# Patient Record
Sex: FEMALE | Race: WHITE | NOT HISPANIC OR LATINO | Employment: FULL TIME | URBAN - METROPOLITAN AREA
[De-identification: names, ages, dates, MRNs, and addresses within clinical notes are randomized per-mention and may not be internally consistent; named-entity substitution may affect disease eponyms.]

---

## 2024-11-21 ENCOUNTER — EVALUATION (OUTPATIENT)
Dept: PHYSICAL THERAPY | Facility: CLINIC | Age: 44
End: 2024-11-21
Payer: COMMERCIAL

## 2024-11-21 DIAGNOSIS — M76.892 HIP FLEXOR TENDONITIS, LEFT: ICD-10-CM

## 2024-11-21 DIAGNOSIS — M25.552 LEFT HIP PAIN: Primary | ICD-10-CM

## 2024-11-21 DIAGNOSIS — M22.2X2 PATELLOFEMORAL SYNDROME OF LEFT KNEE: ICD-10-CM

## 2024-11-21 PROCEDURE — 97161 PT EVAL LOW COMPLEX 20 MIN: CPT

## 2024-11-21 NOTE — PROGRESS NOTES
PT Evaluation     Today's date: 2024  Patient name: Nidhi Henry  : 1980  MRN: [id number]  Referring provider: Alphonse Rogers MD  Dx:   Encounter Diagnosis     ICD-10-CM    1. Left hip pain  M25.552       2. Hip flexor tendonitis, left  M76.892       3. Patellofemoral syndrome of left knee  M22.2X2                      Assessment  Impairments: abnormal coordination, abnormal muscle firing, abnormal or restricted ROM, impaired physical strength, lacks appropriate home exercise program, pain with function, weight-bearing intolerance, attention deficits, participation limitations, activity limitations and endurance  Symptom irritability: low    Assessment details: Patient is a 44 year old female with both left hip and left knee pain. Patient has deficits with hip flexor and hip abduction strength. Patient has deficits with squats and sitting for long periods of time at work. Patient was positive for iliana test and had knee valgus with SL squat. Patient was educated on HEP and injury. Patient's deficits are consistent with patellafemoral syndrome and hip flexor tendonitis. Patient would benefit from continued skilled PT to address to PLOF.   Understanding of Dx/Px/POC: good     Prognosis: good    Goals  STG 1-4 weeks   1. Patient will be independent with HEP.   2. Patient will be in less than 3/10 pain with ADLS.   3. Patient will increase all planes of LE MMTs by 1  4. Patient will be able to sit for 4 hours without hip pain.   5. Patient will be able to do a lateral step down without knee valgus.     LTG 6-8 weeks.   1. Patient will increase FOTO score by 10 points.   2. Patient will be able to squat without compensations.   3. Patient will be able to run one mile without pain.   5. Patient will be able to have 5/5 LE strength to help improve with recreational activities.        Plan  Patient would benefit from: skilled physical therapy  Planned modality interventions: cryotherapy    Planned  therapy interventions: ADL retraining, ADL training, balance, breathing training, flexibility, functional ROM exercises, gait training, graded activity, graded exercise, strengthening, stretching, therapeutic activities, therapeutic exercise, therapeutic training, patient/caregiver education, manual therapy, joint mobilization, activity modification, abdominal trunk stabilization, balance/weight bearing training, muscle pump exercises, nerve gliding and neuromuscular re-education    Frequency: 2x week  Duration in weeks: 6  Plan of Care beginning date: 11/21/2024  Plan of Care expiration date: 12/23/2024  Treatment plan discussed with: patient  Plan details: HEP development, stretching, strengthening, A/AA/PROM, joint mobilizations, posture education, body mechanics training for bending and lifting, STM/MI as needed to reduce muscle tension, balance and proprioceptive training, muscle reeducation, PLOC discussed and agreed upon with patient.     Subjective Evaluation    History of Present Illness  Mechanism of injury: Patient reports that she has lingering Left hip and now is starting to get knee pain. She said her hip pain has been going on for 3 years. She said that kneeling bothers it the most. She noticed it when she was kneeling and painting her deck. She said she is not able squat deep without pain. She said Frog squat hurts. For working out she does [place], walking on an incline, plyometrics in basement. She says she sit behind a desk all day. She has a standing desk but needs to use it more. She says that hip flexor stretches feel good. She says external rotation with her hip bother it, especially when the o when it bothers it   Feels stuck   Crossing legs bothers     Used to be a gymnast   Courtneying   [facility]     asher Valdez, bird dog   Patient Goals  Patient goals for therapy: decreased pain, improved balance, independence with ADLs/IADLs, increased strength, return to work and increased  "motion    Pain  Current pain ratin  At best pain ratin  At worst pain rating: 10  Quality: dull ache  Relieving factors: change in position and relaxation  Aggravating factors: sitting    Social Support  Lives with: significant other and young children    Employment status: working    Diagnostic Tests  X-ray: normal  Treatments  Current treatment: physical therapy    Objective     Tenderness     Left Hip   Tenderness in the ASIS. No tenderness in the greater trochanter.     Neurological Testing     Sensation     Hip   Left Hip   Intact: light touch    Right Hip   Intact: light touch    Active Range of Motion   Left Hip   Normal active range of motion    Strength/Myotome Testing     Left Hip   Planes of Motion   Flexion: 4  Extension: 5  Abduction: 4    Isolated Muscles   Gluteus medius: 4+    Right Hip   Planes of Motion   Flexion: 5  Extension: 5  Abduction: 4+    Left Knee   Flexion: 5  Extension: 5    Right Knee   Flexion: 5  Extension: 5    Tests     Left Hip   Positive FADIR and scour.   Negative FRANK.   Waldemar: Positive.   SLR: Positive.     Ambulation     Observational Gait   Gait: within functional limits     Functional Assessment      Squat    Left within functional limits and right within functional limits.     Single Leg Squat   Left Leg  Pain and valgus.     Right Leg  Valgus.     Forward Step Up 8\"   Left Leg  Within functional limits.     Right Leg  Within functional limits.     Forward Step Down 8\"   Left Leg  Valgus.     Single Leg Stance   Left single leg stance time: wnl.    Comments  Lateral step down more knee valgus with L compared to R            Insurance:  A/CMS Eval/ Re-eval Auth #/ Referral # Total units or visits Start date  Expiration date KX? Visit limitation?  PT only or  PT+OT? Co-Insurance   CMS  no    0                 POC Start Date POC Expiration Date Signed POC?          Date               Visits/Units: pend Used 1              Authed:  " Remaining                    Precautions: NA      11/21       IE   Manuals                                   Neuro Re-Ed       deadbug       Birddog        chops       SL squats TRX                             Ther Ex       SLR    2x10   Hip abduction    2x10   SL bridge    2x10   Hip flexor stretch     2x10                               Ther Activity                     Gait Training                     Modalities

## 2024-11-21 NOTE — LETTER
2024    Alphonse Rogers MD  [address] [facility] [address]    Patient: Nidhi Henry   YOB: 1980   Date of Visit: 2024     Encounter Diagnosis     ICD-10-CM    1. Left hip pain  M25.552       2. Hip flexor tendonitis, left  M76.892       3. Patellofemoral syndrome of left knee  M22.2X2           Dear Dr. Rogers:    Thank you for your recent referral of Nidhi Henry. Please review the attached evaluation summary from Nidhi's recent visit.     Please verify that you agree with the plan of care by signing the attached order.     If you have any questions or concerns, please do not hesitate to call.     I sincerely appreciate the opportunity to share in the care of one of your patients and hope to have another opportunity to work with you in the near future.       Sincerely,    Selam Carrero, PT      Referring Provider:      I certify that I have read the below Plan of Care and certify the need for these services furnished under this plan of treatment while under my care.                    Alphonse Rogers MD  [address] [facility] [address]  Via Fax: [phone]          PT Evaluation     Today's date: 2024  Patient name: iNdhi Henry  : 1980  MRN: [id number]  Referring provider: Alphonse Rogers MD  Dx:   Encounter Diagnosis     ICD-10-CM    1. Left hip pain  M25.552       2. Hip flexor tendonitis, left  M76.892       3. Patellofemoral syndrome of left knee  M22.2X2                      Assessment  Impairments: abnormal coordination, abnormal muscle firing, abnormal or restricted ROM, impaired physical strength, lacks appropriate home exercise program, pain with function, weight-bearing intolerance, attention deficits, participation limitations, activity limitations and endurance  Symptom irritability: low    Assessment details: Patient is a 44 year old female with both left hip and left knee pain. Patient has deficits with hip flexor  and hip abduction strength. Patient has deficits with squats and sitting for long periods of time at work. Patient was positive for iliana test and had knee valgus with SL squat. Patient was educated on HEP and injury. Patient's deficits are consistent with patellafemoral syndrome and hip flexor tendonitis. Patient would benefit from continued skilled PT to address to PLOF.   Understanding of Dx/Px/POC: good     Prognosis: good    Goals  STG 1-4 weeks   1. Patient will be independent with HEP.   2. Patient will be in less than 3/10 pain with ADLS.   3. Patient will increase all planes of LE MMTs by 1  4. Patient will be able to sit for 4 hours without hip pain.   5. Patient will be able to do a lateral step down without knee valgus.     LTG 6-8 weeks.   1. Patient will increase FOTO score by 10 points.   2. Patient will be able to squat without compensations.   3. Patient will be able to run one mile without pain.   5. Patient will be able to have 5/5 LE strength to help improve with recreational activities.        Plan  Patient would benefit from: skilled physical therapy  Planned modality interventions: cryotherapy    Planned therapy interventions: ADL retraining, ADL training, balance, breathing training, flexibility, functional ROM exercises, gait training, graded activity, graded exercise, strengthening, stretching, therapeutic activities, therapeutic exercise, therapeutic training, patient/caregiver education, manual therapy, joint mobilization, activity modification, abdominal trunk stabilization, balance/weight bearing training, muscle pump exercises, nerve gliding and neuromuscular re-education    Frequency: 2x week  Duration in weeks: 6  Plan of Care beginning date: 11/21/2024  Plan of Care expiration date: 12/23/2024  Treatment plan discussed with: patient  Plan details: HEP development, stretching, strengthening, A/AA/PROM, joint mobilizations, posture education, body mechanics training for bending and  lifting, STM/MI as needed to reduce muscle tension, balance and proprioceptive training, muscle reeducation, PLOC discussed and agreed upon with patient.     Subjective Evaluation    History of Present Illness  Mechanism of injury: Patient reports that she has lingering Left hip and now is starting to get knee pain. She said her hip pain has been going on for 3 years. She said that kneeling bothers it the most. She noticed it when she was kneeling and painting her deck. She said she is not able squat deep without pain. She said Frog squat hurts. For working out she does [place], walking on an incline, plyometrics in basement. She says she sit behind a desk all day. She has a standing desk but needs to use it more. She says that hip flexor stretches feel good. She says external rotation with her hip bother it, especially when the o when it bothers it   Feels stuck   Crossing legs bothers     Used to be a gymnast   Clicking   [facility] [employer]     Bridge, deadbug, bird dog   Patient Goals  Patient goals for therapy: decreased pain, improved balance, independence with ADLs/IADLs, increased strength, return to work and increased motion    Pain  Current pain ratin  At best pain ratin  At worst pain rating: 10  Quality: dull ache  Relieving factors: change in position and relaxation  Aggravating factors: sitting    Social Support  Lives with: significant other and young children    Employment status: working    Diagnostic Tests  X-ray: normal  Treatments  Current treatment: physical therapy    Objective     Tenderness     Left Hip   Tenderness in the ASIS. No tenderness in the greater trochanter.     Neurological Testing     Sensation     Hip   Left Hip   Intact: light touch    Right Hip   Intact: light touch    Active Range of Motion   Left Hip   Normal active range of motion    Strength/Myotome Testing     Left Hip   Planes of Motion   Flexion: 4  Extension: 5  Abduction: 4    Isolated Muscles   Gluteus  "medius: 4+    Right Hip   Planes of Motion   Flexion: 5  Extension: 5  Abduction: 4+    Left Knee   Flexion: 5  Extension: 5    Right Knee   Flexion: 5  Extension: 5    Tests     Left Hip   Positive FADIR and scour.   Negative FRANK.   Waldemar: Positive.   SLR: Positive.     Ambulation     Observational Gait   Gait: within functional limits     Functional Assessment      Squat    Left within functional limits and right within functional limits.     Single Leg Squat   Left Leg  Pain and valgus.     Right Leg  Valgus.     Forward Step Up 8\"   Left Leg  Within functional limits.     Right Leg  Within functional limits.     Forward Step Down 8\"   Left Leg  Valgus.     Single Leg Stance   Left single leg stance time: wnl.    Comments  Lateral step down more knee valgus with L compared to R            Insurance:  A/CMS Eval/ Re-eval Auth #/ Referral # Total units or visits Start date  Expiration date KX? Visit limitation?  PT only or  PT+OT? Co-Insurance   CMS 11/21 11/21 no    0                 POC Start Date POC Expiration Date Signed POC?   11/21 12/21       Date 11/21              Visits/Units: pend Used 1              Authed:  Remaining                    Precautions: NA      11/21       IE   Manuals                                   Neuro Re-Ed       deadbug       Birddog        chops       SL squats TRX                             Ther Ex       SLR    2x10   Hip abduction    2x10   SL bridge    2x10   Hip flexor stretch     2x10                               Ther Activity                     Gait Training                     Modalities                                         "

## 2024-11-27 ENCOUNTER — OFFICE VISIT (OUTPATIENT)
Dept: PHYSICAL THERAPY | Facility: CLINIC | Age: 44
End: 2024-11-27
Payer: COMMERCIAL

## 2024-11-27 DIAGNOSIS — M76.892 HIP FLEXOR TENDONITIS, LEFT: ICD-10-CM

## 2024-11-27 DIAGNOSIS — M22.2X2 PATELLOFEMORAL SYNDROME OF LEFT KNEE: ICD-10-CM

## 2024-11-27 DIAGNOSIS — M25.552 LEFT HIP PAIN: Primary | ICD-10-CM

## 2024-11-27 PROCEDURE — 97112 NEUROMUSCULAR REEDUCATION: CPT

## 2024-11-27 NOTE — PROGRESS NOTES
Daily Note     Today's date: 2024  Patient name: Nidhi Henry  : 1980  MRN: [id number]  Referring provider: Alphonse Rogers MD  Dx:   Encounter Diagnosis     ICD-10-CM    1. Left hip pain  M25.552       2. Patellofemoral syndrome of left knee  M22.2X2       3. Hip flexor tendonitis, left  M76.892                      Subjective: Patient said her hip and knee are feeling good today. She said she has been doing her HEP.       Objective: See treatment diary below      Assessment: Tolerated treatment well. Patient was given verbal cueing for lateral step downs. Patient was given verbal cueing for pelvic control of dead bugs. Patient can progress with more SL exercises.  Patient would benefit from continued PT      Plan: Continue per plan of care.       Insurance:  A/CMS Eval/ Re-eval Auth #/ Referral # Total units or visits Start date  Expiration date KX? Visit limitation?  PT only or  PT+OT? Co-Insurance   CMS  no    0                 POC Start Date POC Expiration Date Signed POC?          Date              Visits/Units: pend Used 1 2             Authed:  Remaining                    Precautions: NA            IE   Manuals                                   Neuro Re-Ed       deadbug   2x10    Birddog        chops       SL squats TRX        SL clamshells    Red 2x10     Lateral step down   2x10 4 inch step     SL RDL    X15     Static lung       Ther Ex       SLR   2x10  2x10   Hip abduction    2x10   SL bridge    2x10   Hip flexor stretch     2x10   FRANK   2x30s                          Ther Activity                     Gait Training                     Modalities

## 2024-12-02 ENCOUNTER — OFFICE VISIT (OUTPATIENT)
Dept: PHYSICAL THERAPY | Facility: CLINIC | Age: 44
End: 2024-12-02
Payer: COMMERCIAL

## 2024-12-02 DIAGNOSIS — M25.552 LEFT HIP PAIN: ICD-10-CM

## 2024-12-02 DIAGNOSIS — M22.2X2 PATELLOFEMORAL SYNDROME OF LEFT KNEE: Primary | ICD-10-CM

## 2024-12-02 DIAGNOSIS — M76.892 HIP FLEXOR TENDONITIS, LEFT: ICD-10-CM

## 2024-12-02 PROCEDURE — 97112 NEUROMUSCULAR REEDUCATION: CPT

## 2024-12-02 NOTE — PROGRESS NOTES
Daily Note     Today's date: 2024  Patient name: Nidhi Henry  : 1980  MRN: [id number]  Referring provider: Alphonse Rogers MD  Dx:   Encounter Diagnosis     ICD-10-CM    1. Patellofemoral syndrome of left knee  M22.2X2       2. Hip flexor tendonitis, left  M76.892       3. Left hip pain  M25.552                      Subjective: Patient said her hip is feeling good. She said she did a lot of walking this weekend and it went well.       Objective: See treatment diary below      Assessment: Tolerated treatment well. Patient was given cueing for knee valgus with SL leg press. Patient was able to properly engage core with chops w the eleni and paloff press. Patient would benefit from continued PT      Plan: Continue per plan of care.         Insurance:  A/CMS Eval/ Re-eval Auth #/ Referral # Total units or visits Start date  Expiration date KX? Visit limitation?  PT only or  PT+OT? Co-Insurance   CMS  no    0                 POC Start Date POC Expiration Date Signed POC?          Date              Visits/Units: pend Used 1 2 3            Authed:  Remaining                    Precautions: NA           IE   Manuals                                   Neuro Re-Ed       deadbug   2x10    Birddog    2x10    chops  2x10 eleni 3.0      SL squats TRX        SL clamshells    Red 2x10     Lateral step down  2x10 6 inch w hip hike 2x10 4 inch step     SL RDL    X15     Static lung   2x10     Side steps  4 laps      Ther Ex       SLR  Off the table 2x10  2x10  2x10   Prone ext   2x10 off the table      Hip abduction    2x10   SL bridge    2x10   Hip flexor stretch     2x10   FRANK  2x30s  2x30s     Waldemar stretch   2x30s                    Ther Activity                     Gait Training                     Modalities

## 2024-12-04 ENCOUNTER — OFFICE VISIT (OUTPATIENT)
Dept: PHYSICAL THERAPY | Facility: CLINIC | Age: 44
End: 2024-12-04
Payer: COMMERCIAL

## 2024-12-04 DIAGNOSIS — M25.552 LEFT HIP PAIN: ICD-10-CM

## 2024-12-04 DIAGNOSIS — M76.892 HIP FLEXOR TENDONITIS, LEFT: ICD-10-CM

## 2024-12-04 DIAGNOSIS — M22.2X2 PATELLOFEMORAL SYNDROME OF LEFT KNEE: Primary | ICD-10-CM

## 2024-12-04 PROCEDURE — 97112 NEUROMUSCULAR REEDUCATION: CPT

## 2024-12-04 NOTE — PROGRESS NOTES
Daily Note     Today's date: 2024  Patient name: Nidhi Henry  : 1980  MRN: [id number]  Referring provider: Alphonse Rogers MD  Dx:   Encounter Diagnosis     ICD-10-CM    1. Patellofemoral syndrome of left knee  M22.2X2       2. Hip flexor tendonitis, left  M76.892       3. Left hip pain  M25.552                      Subjective: Patient reports that she is feeling good. She said she was a little sore after last session.       Objective: See treatment diary below      Assessment: Tolerated treatment well. Patient was able to SL squats with good technique. Patient was challenged by mountain climber with slider and bosu. Patient was fatigue with nordic curls. Patient would benefit from continued PT      Plan: Continue per plan of care.         Insurance:  A/CMS Eval/ Re-eval Auth #/ Referral # Total units or visits Start date  Expiration date KX? Visit limitation?  PT only or  PT+OT? Co-Insurance   CMS  no    0                 POC Start Date POC Expiration Date Signed POC?          Date              Visits/Units: pend Used 1 2 3            Authed:  Remaining                    Precautions: NA          IE   Manuals                                   Neuro Re-Ed       deadbug   2x10    Birddog    2x10    chops  2x10 eleni 3.0      SL squats TRX        SL clamshells    Red 2x10     Lateral step down  2x10 6 inch w hip hike 2x10 4 inch step     SL RDL    X15     Static lung   2x10     Side steps  4 laps      Nordic curls  2x10 w airex       Squat w bosu 2x10      SLS on airex trampoline  2x10       Ther Ex       SLR Off the table 2x10  Off the table 2x10  2x10  2x10   Prone ext  Off of the table 2x10  2x10 off the table      Hip abduction    2x10   SL bridge Green ball 2x10     Ball lift 2x10    2x10   Hip flexor stretch     2x10   FRANK  2x30s  2x30s     Waldemar stretch   2x30s                    Ther Activity                     Gait Training                      Modalities

## 2024-12-09 ENCOUNTER — APPOINTMENT (OUTPATIENT)
Dept: PHYSICAL THERAPY | Facility: CLINIC | Age: 44
End: 2024-12-09
Payer: COMMERCIAL

## 2024-12-10 ENCOUNTER — OFFICE VISIT (OUTPATIENT)
Dept: PHYSICAL THERAPY | Facility: CLINIC | Age: 44
End: 2024-12-10
Payer: COMMERCIAL

## 2024-12-10 DIAGNOSIS — M25.552 LEFT HIP PAIN: ICD-10-CM

## 2024-12-10 DIAGNOSIS — M76.892 HIP FLEXOR TENDONITIS, LEFT: ICD-10-CM

## 2024-12-10 DIAGNOSIS — M22.2X2 PATELLOFEMORAL SYNDROME OF LEFT KNEE: Primary | ICD-10-CM

## 2024-12-10 PROCEDURE — 97112 NEUROMUSCULAR REEDUCATION: CPT

## 2024-12-10 NOTE — PROGRESS NOTES
Daily Note     Today's date: 12/10/2024  Patient name: Nidhi Henry  : 1980  MRN: [id number]  Referring provider: Alphonse Rogers MD  Dx:   Encounter Diagnosis     ICD-10-CM    1. Patellofemoral syndrome of left knee  M22.2X2       2. Hip flexor tendonitis, left  M76.892       3. Left hip pain  M25.552                      Subjective: Patient reports she did have an episode of burning pain when she was sitting with her daughter doing hw yesterday. She attribute it to maybe walking a lot during the day and then sitting too long.      Objective: See treatment diary below      Assessment: Tolerated treatment well. Patient was fatigue at the end of the session. Patient would benefit from continued PT      Plan: Continue per plan of care.         Insurance:  A/CMS Eval/ Re-eval Auth #/ Referral # Total units or visits Start date  Expiration date KX? Visit limitation?  PT only or  PT+OT? Co-Insurance   CMS  no    0                 POC Start Date POC Expiration Date Signed POC?          Date 11/21 11/27 12/2  12/4 12/10          Visits/Units: pend Used 1 2 3 4 5          Authed:  Remaining                    Precautions: NA   12/10 12/4 12/2 11/27 11/21    5 4 3 2 IE   Manuals                                        Neuro Re-Ed        deadbug    2x10    Birddog  2x10 UE and LE   2x10    chops   2x10 eleni 3.0      SL squats TRX         SL clamshells  Plank clamshells 2x10    Red 2x10     Lateral step down   2x10 6 inch w hip hike 2x10 4 inch step     SL RDL  Tidal tankk 2x10   X15     Static lung    2x10     Side steps   4 laps      Nordic curls  2x10  2x10 w airex       Squat w bosu  2x10      SLS on airex trampoline  2x10 2x10       Estonian split squat  2x10       Hip thruster on high low  2x10 SL               Ther Ex        SLR  Off the table 2x10  Off the table 2x10  2x10  2x10   Prone ext   Off of the table 2x10  2x10 off the table      Hip abduction     2x10   SL bridge  Green  ball 2x10     Ball lift 2x10    2x10   Hip flexor stretch      2x10   FRANK   2x30s  2x30s     Waldemar stretch  2x30s   2x30s                      Ther Activity                        Gait Training                        Modalities

## 2024-12-11 ENCOUNTER — APPOINTMENT (OUTPATIENT)
Dept: PHYSICAL THERAPY | Facility: CLINIC | Age: 44
End: 2024-12-11
Payer: COMMERCIAL

## 2024-12-12 ENCOUNTER — OFFICE VISIT (OUTPATIENT)
Dept: PHYSICAL THERAPY | Facility: CLINIC | Age: 44
End: 2024-12-12
Payer: COMMERCIAL

## 2024-12-12 DIAGNOSIS — M25.552 LEFT HIP PAIN: ICD-10-CM

## 2024-12-12 DIAGNOSIS — M76.892 HIP FLEXOR TENDONITIS, LEFT: ICD-10-CM

## 2024-12-12 DIAGNOSIS — M22.2X2 PATELLOFEMORAL SYNDROME OF LEFT KNEE: Primary | ICD-10-CM

## 2024-12-12 PROCEDURE — 97112 NEUROMUSCULAR REEDUCATION: CPT

## 2024-12-12 NOTE — PROGRESS NOTES
Daily Note     Today's date: 2024  Patient name: Nidhi Henry  : 1980  MRN: [id number]  Referring provider: Alphonse Rogers MD  Dx:   Encounter Diagnosis     ICD-10-CM    1. Patellofemoral syndrome of left knee  M22.2X2       2. Hip flexor tendonitis, left  M76.892       3. Left hip pain  M25.552                      Subjective: Patient reported that she is feeling good. She has some pain when she is kneeling or when she bends her knee all the way.       Objective: See treatment diary below      Assessment: Tolerated treatment well. Patient had discomfort when she was kneeling on her knees. Patient would benefit from continued PT      Plan: Continue per plan of care.         Insurance:  AMA/CMS Eval/ Re-eval Auth #/ Referral # Total units or visits Start date  Expiration date KX? Visit limitation?  PT only or  PT+OT? Co-Insurance   CMS  no    0                 POC Start Date POC Expiration Date Signed POC?          Date 11/21 11/27 12/2  12/4 12/10 12/12         Visits/Units: pend Used 1 2 3 4 5 6         Authed:  Remaining                    Precautions: NA   12/12 12/10 12/4 12/2 11/27 11/21    6 5 4 3 2 IE   Manuals                                             Neuro Re-Ed         deadbug     2x10    Birddog   2x10 UE and LE   2x10    chops With pink medicine ball 2x10    2x10 eleni 3.0      SL squats TRX  2x10         SL clamshells   Plank clamshells 2x10    Red 2x10     Lateral step down    2x10 6 inch w hip hike 2x10 4 inch step     SL RDL  [place] 3.0  Tidal tankk 2x10   X15     Static lung     2x10     Side steps    4 laps      Nordic curls  2x10 2x10  2x10 w airex       Squat w bosu   2x10      SLS on airex trampoline   2x10 2x10       Sami split squat  2x10 on table  2x10       Hip thruster on high low   2x10 SL       Side plank 2x30s         Ther Ex         SLR 2x10 2 lb   Off the table 2x10  Off the table 2x10  2x10  2x10   Prone ext    Off of the table 2x10   2x10 off the table      Hip abduction      2x10   SL bridge March 2x10   Green ball 2x10     Ball lift 2x10    2x10   Hip flexor stretch       2x10   FRANK    2x30s  2x30s     Waldemar stretch  2x30s  2x30s   2x30s                        Ther Activity                           Gait Training                           Modalities

## 2024-12-16 ENCOUNTER — APPOINTMENT (OUTPATIENT)
Dept: PHYSICAL THERAPY | Facility: CLINIC | Age: 44
End: 2024-12-16
Payer: COMMERCIAL

## 2024-12-18 ENCOUNTER — OFFICE VISIT (OUTPATIENT)
Dept: PHYSICAL THERAPY | Facility: CLINIC | Age: 44
End: 2024-12-18
Payer: COMMERCIAL

## 2024-12-18 DIAGNOSIS — M25.552 LEFT HIP PAIN: Primary | ICD-10-CM

## 2024-12-18 DIAGNOSIS — M22.2X2 PATELLOFEMORAL SYNDROME OF LEFT KNEE: ICD-10-CM

## 2024-12-18 DIAGNOSIS — M76.892 HIP FLEXOR TENDONITIS, LEFT: ICD-10-CM

## 2024-12-18 PROCEDURE — 97110 THERAPEUTIC EXERCISES: CPT

## 2024-12-18 PROCEDURE — 97112 NEUROMUSCULAR REEDUCATION: CPT

## 2024-12-18 NOTE — PROGRESS NOTES
Daily Note     Today's date: 2024  Patient name: Nidhi Henry  : 1980  MRN: [id number]  Referring provider: Alphonse Rogers MD  Dx:   Encounter Diagnosis     ICD-10-CM    1. Left hip pain  M25.552       2. Hip flexor tendonitis, left  M76.892       3. Patellofemoral syndrome of left knee  M22.2X2                      Subjective: Patient said she had pain in her knee when she bends it and is kneeling.       Objective: See treatment diary below      Assessment: Tolerated treatment well. Patient has significant decrease in quad length with quad stretch. Patient was educated on timeline with injury for patellofemoral syndrome. Patient had significantly hypomobility in patellar mobilizations in all directions.  Patient was fatigue with hip abduction with 2lb and [place] planks. Patient was given updated HEP. Patient would benefit from continued PT      Plan: Continue per plan of care.  Progress note during next visit.         Insurance:  AMA/CMS Eval/ Re-eval Auth #/ Referral # Total units or visits Start date  Expiration date KX? Visit limitation?  PT only or  PT+OT? Co-Insurance   CMS  no    0                 POC Start Date POC Expiration Date Signed POC?          Date 11/21 11/27 12/2  12/4 12/10 12/12 12/18        Visits/Units: pend Used 1 2 3 4 5 6 7        Authed:  Remaining         RE           Precautions: NA   12/18 12/12 12/10 12/4 12/2 11/27 11/21    7 6 5 4 3 2 IE   Manuals                                                  Neuro Re-Ed          deadbug      2x10    Birddog    2x10 UE and LE   2x10    chops  With pink medicine ball 2x10    2x10 eleni 3.0      SL squats TRX   2x10         SL clamshells    Plank clamshells 2x10    Red 2x10     Lateral step down     2x10 6 inch w hip hike 2x10 4 inch step     SL RDL   Kesier 3.0  Tidal tankk 2x10   X15     Static lung      2x10     Side steps     4 laps      Nordic curls   2x10 2x10  2x10 w airex       Squat w bosu     2x10      SLS on airex trampoline    2x10 2x10       [place] split squat  2x10 chair  2x10 on table  2x10       Hip thruster on high low    2x10 SL       Side plank [place] plank   3x15s  2x30s         Paloff press 3.0 2x10          Step downs  4 inch 2x10          Ther Ex          SLR 2x10 2lb  2x10 2 lb   Off the table 2x10  Off the table 2x10  2x10  2x10   Prone ext     Off of the table 2x10  2x10 off the table      Hip abduction 2x10 2lb       2x10   SL bridge  March 2x10   Green ball 2x10     Ball lift 2x10    2x10   Hip flexor stretch  2x30s       2x10   FRANK 2x30s     2x30s  2x30s     Waldemar stretch  2x30s  2x30s  2x30s   2x30s      Quad stretch  2x30s                    Ther Activity                              Gait Training                              Modalities

## 2024-12-23 ENCOUNTER — OFFICE VISIT (OUTPATIENT)
Dept: PHYSICAL THERAPY | Facility: CLINIC | Age: 44
End: 2024-12-23
Payer: COMMERCIAL

## 2024-12-23 DIAGNOSIS — M76.892 HIP FLEXOR TENDONITIS, LEFT: ICD-10-CM

## 2024-12-23 DIAGNOSIS — M22.2X2 PATELLOFEMORAL SYNDROME OF LEFT KNEE: Primary | ICD-10-CM

## 2024-12-23 DIAGNOSIS — M25.552 LEFT HIP PAIN: ICD-10-CM

## 2024-12-23 PROCEDURE — 97112 NEUROMUSCULAR REEDUCATION: CPT

## 2024-12-23 NOTE — PROGRESS NOTES
Daily Note     Today's date: 2024  Patient name: Nidhi Henry  : 1980  MRN: [id number]  Referring provider: Alphonse Rogers MD  Dx:   Encounter Diagnosis     ICD-10-CM    1. Patellofemoral syndrome of left knee  M22.2X2       2. Hip flexor tendonitis, left  M76.892       3. Left hip pain  M25.552                      Subjective: Patient said her pain is still very intermittent. She said it is still hard to tell when she is going to have pain and the time of day.       Objective: See treatment diary below      Assessment: Tolerated treatment well. Patient would benefit from continued PT      Plan: Continue per plan of care.         Insurance:  AMA/CMS Eval/ Re-eval Auth #/ Referral # Total units or visits Start date  Expiration date KX? Visit limitation?  PT only or  PT+OT? Co-Insurance   CMS  no    0                 POC Start Date POC Expiration Date Signed POC?          Date 11/21 11/27 12/2  12/4 12/10 12/12 12/18 12/23       Visits/Units: pend Used 1 2 3 4 5 6 7 8       Authed:  Remaining          RE          Precautions: NA   12/23 12/18 12/12 12/10 12/4 12/2 11/27 11/21    8 7 6 5 4 3 2 IE   Manuals                                                       Neuro Re-Ed           deadbug       2x10    Birddog     2x10 UE and LE   2x10    chops   With pink medicine ball 2x10    2x10 eleni 3.0      SL squats TRX    2x10         SL clamshells     Plank clamshells 2x10    Red 2x10     Lateral step down      2x10 6 inch w hip hike 2x10 4 inch step     SL RDL    Kesier 3.0  Tidal tankk 2x10   X15     Static lung       2x10     Side steps 4 laps green tb      4 laps      Nordic curls    2x10 2x10  2x10 w airex       Squat w bosu     2x10      SLS on airex trampoline     2x10 2x10       Japanese split squat  2x10 w 10 lb chair  2x10 chair  2x10 on table  2x10       Hip thruster on high low     2x10 SL       Side plank  [place] plank   3x15s  2x30s         Paloff press  3.0 2x10           Step downs   4 inch 2x10          sled 95 lb 5 laps           Steps  8 inch and 6 ich   X15 10 lb           Ther Ex           SLR 2x10 2lb  2x10 2lb  2x10 2 lb   Off the table 2x10  Off the table 2x10  2x10  2x10   Prone ext      Off of the table 2x10  2x10 off the table      Hip abduction  2x10 2lb       2x10   SL bridge Green ball hamstring curl 2x10   March 2x10   Green ball 2x10     Ball lift 2x10    2x10   Hip flexor stretch  2x30s  2x30s       2x10   FRANK  2x30s     2x30s  2x30s     Waldemar stretch   2x30s  2x30s  2x30s   2x30s      Quad stretch   2x30s                     Ther Activity                                 Gait Training                                 Modalities

## 2024-12-26 ENCOUNTER — APPOINTMENT (OUTPATIENT)
Dept: PHYSICAL THERAPY | Facility: CLINIC | Age: 44
End: 2024-12-26
Payer: COMMERCIAL

## 2024-12-27 ENCOUNTER — EVALUATION (OUTPATIENT)
Dept: PHYSICAL THERAPY | Facility: CLINIC | Age: 44
End: 2024-12-27
Payer: COMMERCIAL

## 2024-12-27 DIAGNOSIS — M76.892 HIP FLEXOR TENDONITIS, LEFT: ICD-10-CM

## 2024-12-27 DIAGNOSIS — M22.2X2 PATELLOFEMORAL SYNDROME OF LEFT KNEE: Primary | ICD-10-CM

## 2024-12-27 DIAGNOSIS — M25.552 LEFT HIP PAIN: ICD-10-CM

## 2024-12-27 PROCEDURE — 97110 THERAPEUTIC EXERCISES: CPT

## 2024-12-27 PROCEDURE — 97112 NEUROMUSCULAR REEDUCATION: CPT

## 2024-12-27 NOTE — LETTER
2024    Alphonse Rogers MD  [address] [facility] [address]    Patient: Nidhi Henry   YOB: 1980   Date of Visit: 2024     Encounter Diagnosis     ICD-10-CM    1. Patellofemoral syndrome of left knee  M22.2X2       2. Hip flexor tendonitis, left  M76.892       3. Left hip pain  M25.552           Dear Dr. Rogers:    Thank you for your recent referral of Nidhi Henry. Please review the attached evaluation summary from Nidhi's recent visit.     Please verify that you agree with the plan of care by signing the attached order.     If you have any questions or concerns, please do not hesitate to call.     I sincerely appreciate the opportunity to share in the care of one of your patients and hope to have another opportunity to work with you in the near future.       Sincerely,    Selam Carrero, PT      Referring Provider:      I certify that I have read the below Plan of Care and certify the need for these services furnished under this plan of treatment while under my care.                    Alphonse Rogers MD  [address] [facility] [address]  Via Fax: [phone]          PT Evaluation     Today's date: 2024  Patient name: Nidhi Henry  : 1980  MRN: [id number]  Referring provider: Alphonse Rogers MD  Dx:   Encounter Diagnosis     ICD-10-CM    1. Patellofemoral syndrome of left knee  M22.2X2       2. Hip flexor tendonitis, left  M76.892       3. Left hip pain  M25.552                        Assessment  Impairments: abnormal coordination, abnormal muscle firing, abnormal or restricted ROM, impaired physical strength, lacks appropriate home exercise program, pain with function, weight-bearing intolerance, attention deficits, participation limitations, activity limitations and endurance  Symptom irritability: low    Assessment details: Patient comes to skilled PT for her 9th session of skilled PT. Patient still had deficits with bending her  knee, squatting. She is still positive for iliana testing, lateral step down test, and scour. Patient was given updated HEP and educated to stretch more often. Patient has made slight improvements with LE strength. Patient would benefit from continued skilled PT to address deficits and return to PLOF.     Patient is a 44 year old female with both left hip and left knee pain. Patient has deficits with hip flexor and hip abduction strength. Patient has deficits with squats and sitting for long periods of time at work. Patient was positive for iliana test and had knee valgus with SL squat. Patient was educated on HEP and injury. Patient's deficits are consistent with patellafemoral syndrome and hip flexor tendonitis. Patient would benefit from continued skilled PT to address to PLOF.   Understanding of Dx/Px/POC: good     Prognosis: good    Goals  STG 1-4 weeks   1. Patient will be independent with HEP. -met  2. Patient will be in less than 3/10 pain with ADLS. -met  3. Patient will increase all planes of LE MMTs by 1 -partially met  4. Patient will be able to sit for 4 hours without hip pain. -met  5. Patient will be able to do a lateral step down without knee valgus. -partially met    LTG 6-8 weeks.   1. Patient will increase FOTO score by 10 points. -not met  2. Patient will be able to squat without compensations. -partially met  3. Patient will be able to run one mile without pain. -not met  5. Patient will be able to have 5/5 LE strength to help improve with recreational activities. -not met     1. Patient will be able to bend down on one knee without pain.   2. Patient will be able to return to pilates / barre without pain.    3. Patient will be able to return to [facility] without hip/ knee pain.     Plan  Patient would benefit from: skilled physical therapy  Planned modality interventions: cryotherapy    Planned therapy interventions: ADL retraining, ADL training, balance, breathing training, flexibility,  functional ROM exercises, gait training, graded activity, graded exercise, strengthening, stretching, therapeutic activities, therapeutic exercise, therapeutic training, patient/caregiver education, manual therapy, joint mobilization, activity modification, abdominal trunk stabilization, balance/weight bearing training, muscle pump exercises, nerve gliding and neuromuscular re-education    Frequency: 2x week  Duration in weeks: 6  Plan of Care beginning date: 12/27/2024  Plan of Care expiration date: 2/1/2025  Treatment plan discussed with: patient  Plan details: HEP development, stretching, strengthening, A/AA/PROM, joint mobilizations, posture education, body mechanics training for bending and lifting, STM/MI as needed to reduce muscle tension, balance and proprioceptive training, muscle reeducation, PLOC discussed and agreed upon with patient.     Subjective Evaluation    History of Present Illness  Mechanism of injury: She says she still has burning in her left hip. When it is overly bent it feels stuck. She said the pain level when she has it is still the same. Patient said she does feel she is getting stronger. She wants to get better at doing her HEP and stretches more often.     Patient reports that she has lingering Left hip and now is starting to get knee pain. She said her hip pain has been going on for 3 years. She said that kneeling bothers it the most. She noticed it when she was kneeling and painting her deck. She said she is not able squat deep without pain. She said Frog squat hurts. For working out she does [place], walking on an incline, plyometrics in basement. She says she sit behind a desk all day. She has a standing desk but needs to use it more. She says that hip flexor stretches feel good. She says external rotation with her hip bother it, especially when the o when it bothers it. Feels stuck, Crossing legs bothers it.           Patient Goals  Patient goals for therapy: decreased pain,  "improved balance, independence with ADLs/IADLs, increased strength, return to work and increased motion    Pain  Current pain ratin  At best pain ratin  At worst pain ratin  Quality: dull ache  Relieving factors: change in position and relaxation  Aggravating factors: sitting    Social Support  Lives with: significant other and young children    Employment status: working    Diagnostic Tests  X-ray: normal  Treatments  Current treatment: physical therapy    Objective     Tenderness     Left Hip   Tenderness in the ASIS. No tenderness in the greater trochanter.     Neurological Testing     Sensation     Hip   Left Hip   Intact: light touch    Right Hip   Intact: light touch    Active Range of Motion   Left Hip   Normal active range of motion    Strength/Myotome Testing     Left Hip   Planes of Motion   Flexion: 4  Extension: 5  Abduction: 4    Isolated Muscles   Gluteus medius: 4+    Right Hip   Planes of Motion   Flexion: 5  Extension: 5  Abduction: 4+    Left Knee   Flexion: 5  Extension: 5    Right Knee   Flexion: 5  Extension: 5    Tests     Left Hip   Positive FADIR and scour.   Negative FRANK.   Waldemar: Positive.   SLR: Positive.     Ambulation     Observational Gait   Gait: within functional limits     Functional Assessment      Squat    Left within functional limits and right within functional limits.     Single Leg Squat   Left Leg  Pain and valgus.     Right Leg  Valgus.     Forward Step Up 8\"   Left Leg  Within functional limits.     Right Leg  Within functional limits.     Forward Step Down 8\"   Left Leg  Valgus.     Single Leg Stance   Left single leg stance time: wnl.    Comments  Lateral step down more knee valgus with L compared to R                     Insurance:  AMA/CMS Eval/ Re-eval Auth #/ Referral # Total units or visits Start date  Expiration date KX? Visit limitation?  PT only or  PT+OT? Co-Insurance   CMS  no    0                 POC Start Date POC Expiration Date " Signed POC?   11/21 12/27 12/21 1/4       Date 11/21 11/27 12/2  12/4 12/10 12/12 12/18 12/23 12/27      Visits/Units: pend Used 1 2 3 4 5 6 7 8 9      Authed:  Remaining          RE          Precautions: NA   12/27 12/23 12/18 12/12    9 8 7 6   Manuals       Hip PROM AD  Lateral hip mob w belt    Distraction                            Neuro Re-Ed       deadbug       Birddog        chops    With pink medicine ball 2x10    SL squats TRX     2x10    SL clamshells  2x10 green tb      Lateral step down       SL RDL     Kesier 3.0    Static lung       Side steps  4 laps green tb      Nordic curls     2x10   Squat w bosu       SLS on airex trampoline        [place] split squat   2x10 w 10 lb chair  2x10 chair  2x10 on table    Hip thruster on high low  2x10      Side plank   [place] plank   3x15s  2x30s    Paloff press   3.0 2x10     Step downs    4 inch 2x10     sled  95 lb 5 laps      Steps   8 inch and 6 ich   X15 10 lb      Ther Ex       SLR  2x10 2lb  2x10 2lb  2x10 2 lb    Prone ext        Hip abduction   2x10 2lb     SL bridge  Green ball hamstring curl 2x10   March 2x10    Hip flexor stretch  2x30s 2x30s  2x30s     FRANK   2x30s     Waldemar stretch    2x30s  2x30s    Quad stretch    2x30s            Ther Activity                     Gait Training                     Modalities

## 2024-12-27 NOTE — PROGRESS NOTES
PT Evaluation     Today's date: 2024  Patient name: Nidhi Henry  : 1980  MRN: [id number]  Referring provider: Alphonse Rogers MD  Dx:   Encounter Diagnosis     ICD-10-CM    1. Patellofemoral syndrome of left knee  M22.2X2       2. Hip flexor tendonitis, left  M76.892       3. Left hip pain  M25.552                        Assessment  Impairments: abnormal coordination, abnormal muscle firing, abnormal or restricted ROM, impaired physical strength, lacks appropriate home exercise program, pain with function, weight-bearing intolerance, attention deficits, participation limitations, activity limitations and endurance  Symptom irritability: low    Assessment details: Patient comes to skilled PT for her 9th session of skilled PT. Patient still had deficits with bending her knee, squatting. She is still positive for iliana testing, lateral step down test, and scour. Patient was given updated HEP and educated to stretch more often. Patient has made slight improvements with LE strength. Patient would benefit from continued skilled PT to address deficits and return to PLOF.     Patient is a 44 year old female with both left hip and left knee pain. Patient has deficits with hip flexor and hip abduction strength. Patient has deficits with squats and sitting for long periods of time at work. Patient was positive for iliana test and had knee valgus with SL squat. Patient was educated on HEP and injury. Patient's deficits are consistent with patellafemoral syndrome and hip flexor tendonitis. Patient would benefit from continued skilled PT to address to PLOF.   Understanding of Dx/Px/POC: good     Prognosis: good    Goals  STG 1-4 weeks   1. Patient will be independent with HEP. -met  2. Patient will be in less than 3/10 pain with ADLS. -met  3. Patient will increase all planes of LE MMTs by 1 -partially met  4. Patient will be able to sit for 4 hours without hip pain. -met  5. Patient will be able to do a  lateral step down without knee valgus. -partially met    LTG 6-8 weeks.   1. Patient will increase FOTO score by 10 points. -not met  2. Patient will be able to squat without compensations. -partially met  3. Patient will be able to run one mile without pain. -not met  5. Patient will be able to have 5/5 LE strength to help improve with recreational activities. -not met     1. Patient will be able to bend down on one knee without pain.   2. Patient will be able to return to [facility] without pain.    3. Patient will be able to return to [facility] without hip/ knee pain.     Plan  Patient would benefit from: skilled physical therapy  Planned modality interventions: cryotherapy    Planned therapy interventions: ADL retraining, ADL training, balance, breathing training, flexibility, functional ROM exercises, gait training, graded activity, graded exercise, strengthening, stretching, therapeutic activities, therapeutic exercise, therapeutic training, patient/caregiver education, manual therapy, joint mobilization, activity modification, abdominal trunk stabilization, balance/weight bearing training, muscle pump exercises, nerve gliding and neuromuscular re-education    Frequency: 2x week  Duration in weeks: 6  Plan of Care beginning date: 12/27/2024  Plan of Care expiration date: 2/1/2025  Treatment plan discussed with: patient  Plan details: HEP development, stretching, strengthening, A/AA/PROM, joint mobilizations, posture education, body mechanics training for bending and lifting, STM/MI as needed to reduce muscle tension, balance and proprioceptive training, muscle reeducation, PLOC discussed and agreed upon with patient.     Subjective Evaluation    History of Present Illness  Mechanism of injury: She says she still has burning in her left hip. When it is overly bent it feels stuck. She said the pain level when she has it is still the same. Patient said she does feel she is getting stronger. She wants to get  better at doing her HEP and stretches more often.     Patient reports that she has lingering Left hip and now is starting to get knee pain. She said her hip pain has been going on for 3 years. She said that kneeling bothers it the most. She noticed it when she was kneeling and painting her deck. She said she is not able squat deep without pain. She said Frog squat hurts. For working out she does [place], walking on an incline, plyometrics in basement. She says she sit behind a desk all day. She has a standing desk but needs to use it more. She says that hip flexor stretches feel good. She says external rotation with her hip bother it, especially when the o when it bothers it. Feels stuck, Crossing legs bothers it.           Patient Goals  Patient goals for therapy: decreased pain, improved balance, independence with ADLs/IADLs, increased strength, return to work and increased motion    Pain  Current pain ratin  At best pain ratin  At worst pain ratin  Quality: dull ache  Relieving factors: change in position and relaxation  Aggravating factors: sitting    Social Support  Lives with: significant other and young children    Employment status: working    Diagnostic Tests  X-ray: normal  Treatments  Current treatment: physical therapy    Objective     Tenderness     Left Hip   Tenderness in the ASIS. No tenderness in the greater trochanter.     Neurological Testing     Sensation     Hip   Left Hip   Intact: light touch    Right Hip   Intact: light touch    Active Range of Motion   Left Hip   Normal active range of motion    Strength/Myotome Testing     Left Hip   Planes of Motion   Flexion: 4  Extension: 5  Abduction: 4    Isolated Muscles   Gluteus medius: 4+    Right Hip   Planes of Motion   Flexion: 5  Extension: 5  Abduction: 4+    Left Knee   Flexion: 5  Extension: 5    Right Knee   Flexion: 5  Extension: 5    Tests     Left Hip   Positive FRANKY and susan.   Negative FRANK.   Waldemar: Positive.   SLR:  "Positive.     Ambulation     Observational Gait   Gait: within functional limits     Functional Assessment      Squat    Left within functional limits and right within functional limits.     Single Leg Squat   Left Leg  Pain and valgus.     Right Leg  Valgus.     Forward Step Up 8\"   Left Leg  Within functional limits.     Right Leg  Within functional limits.     Forward Step Down 8\"   Left Leg  Valgus.     Single Leg Stance   Left single leg stance time: wnl.    Comments  Lateral step down more knee valgus with L compared to R                     Insurance:  AMA/CMS Eval/ Re-eval Auth #/ Referral # Total units or visits Start date  Expiration date KX? Visit limitation?  PT only or  PT+OT? Co-Insurance   CMS 11/21 11/21 no    0                 POC Start Date POC Expiration Date Signed POC?   11/21 12/27 12/21 1/4       Date 11/21 11/27 12/2  12/4 12/10 12/12 12/18 12/23 12/27      Visits/Units: pend Used 1 2 3 4 5 6 7 8 9      Authed:  Remaining          RE          Precautions: NA   12/27 12/23 12/18 12/12    9 8 7 6   Manuals       Hip PROM AD  Lateral hip mob w belt    Distraction                            Neuro Re-Ed       deadbug       Birddog        chops    With pink medicine ball 2x10    SL squats TRX     2x10    SL clamshells  2x10 green tb      Lateral step down       SL RDL     Kesier 3.0    Static lung       Side steps  4 laps green tb      Nordic curls     2x10   Squat w bosu       SLS on airex trampoline        [place] split squat   2x10 w 10 lb chair  2x10 chair  2x10 on table    Hip thruster on high low  2x10      Side plank   [place] plank   3x15s  2x30s    Paloff press   3.0 2x10     Step downs    4 inch 2x10     sled  95 lb 5 laps      Steps   8 inch and 6 ich   X15 10 lb      Ther Ex       SLR  2x10 2lb  2x10 2lb  2x10 2 lb    Prone ext        Hip abduction   2x10 2lb     SL bridge  Green ball hamstring curl 2x10   March 2x10    Hip flexor stretch  2x30s 2x30s  2x30s     FRANK   2x30s   "   Waldemar stretch    2x30s  2x30s    Quad stretch    2x30s            Ther Activity                     Gait Training                     Modalities

## 2024-12-30 ENCOUNTER — APPOINTMENT (OUTPATIENT)
Dept: PHYSICAL THERAPY | Facility: CLINIC | Age: 44
End: 2024-12-30
Payer: COMMERCIAL

## 2025-01-03 ENCOUNTER — OFFICE VISIT (OUTPATIENT)
Dept: PHYSICAL THERAPY | Facility: CLINIC | Age: 45
End: 2025-01-03

## 2025-01-03 DIAGNOSIS — M25.552 LEFT HIP PAIN: ICD-10-CM

## 2025-01-03 DIAGNOSIS — M22.2X2 PATELLOFEMORAL SYNDROME OF LEFT KNEE: ICD-10-CM

## 2025-01-03 DIAGNOSIS — M76.892 HIP FLEXOR TENDONITIS, LEFT: Primary | ICD-10-CM

## 2025-01-03 PROCEDURE — 97112 NEUROMUSCULAR REEDUCATION: CPT

## 2025-01-03 PROCEDURE — 97110 THERAPEUTIC EXERCISES: CPT

## 2025-01-03 NOTE — PROGRESS NOTES
Daily Note     Today's date: 1/3/2025  Patient name: Nidhi Henry  : 1980  MRN: [id number]  Referring provider: Alphonse Rogers MD  Dx:   Encounter Diagnosis     ICD-10-CM    1. Hip flexor tendonitis, left  M76.892       2. Patellofemoral syndrome of left knee  M22.2X2       3. Left hip pain  M25.552                      Subjective: Patient went to the Ortho this week where she got a cortisone shot in her knee. She is not sure if it helped yet or not. Patient said her doctor also recommended MRI and potential surgery.       Objective: See treatment diary below      Assessment: Tolerated treatment well. Discussed with Patient benefits of getting MRI. Discussed and educated Patient on potential Labral tear hip surgery. Patient was educated on continuing stretching and exercise program at home until MRI results.  Patient would benefit from continued PT      Plan: Continue per plan of care.         Insurance:  A/CMS Eval/ Re-eval Auth #/ Referral # Total units or visits Start date  Expiration date KX? Visit limitation?  PT only or  PT+OT? Co-Insurance   CMS  no    0                 POC Start Date POC Expiration Date Signed POC?          Date 11/21 11/27 12/2  12/4 12/10 12/12 12/18 12/23 12/27      Visits/Units: pend Used 1 2 3 4 5 6 7 8 9      Authed:  Remaining          RE          Precautions: NA   1/3 12/27 12/23 12/18 12/12    10 9 8 7 6   Manuals        Hip PROM Distraction  AD  Lateral hip mob w belt    Distraction                               Neuro Re-Ed        deadbug        Birddog         chops     With pink medicine ball 2x10    SL squats TRX  2x10     2x10    SL clamshells   2x10 green tb      Lateral step down        SL RDL      [place]esier 3.0    Static lung        Side steps   4 laps green tb      [place] curls      2x10   Squat w bosu        SLS on airex trampoline         [place] split squat    2x10 w 10 lb chair  2x10 chair  2x10 on table    Hip thruster  on high low   2x10      Side plank    [place] plank   3x15s  2x30s    Paloff press    3.0 2x10     Step downs     4 inch 2x10     sled   95 lb 5 laps      Steps    8 inch and 6 ich   X15 10 lb      Ther Ex        SLR   2x10 2lb  2x10 2lb  2x10 2 lb    Prone ext         Hip abduction    2x10 2lb     SL bridge   Green ball hamstring curl 2x10   March 2x10    Hip flexor stretch  2x30s  2x30s 2x30s  2x30s     FRANK 2x30s    2x30s     [place] stretch  2x30s    2x30s  2x30s    Quad stretch  2x30s    2x30s             Ther Activity                        Gait Training                        Modalities

## 2025-01-06 ENCOUNTER — APPOINTMENT (OUTPATIENT)
Dept: PHYSICAL THERAPY | Facility: CLINIC | Age: 45
End: 2025-01-06

## 2025-01-08 ENCOUNTER — APPOINTMENT (OUTPATIENT)
Dept: PHYSICAL THERAPY | Facility: CLINIC | Age: 45
End: 2025-01-08

## 2025-01-13 ENCOUNTER — APPOINTMENT (OUTPATIENT)
Dept: PHYSICAL THERAPY | Facility: CLINIC | Age: 45
End: 2025-01-13

## 2025-01-15 ENCOUNTER — APPOINTMENT (OUTPATIENT)
Dept: PHYSICAL THERAPY | Facility: CLINIC | Age: 45
End: 2025-01-15

## 2025-01-20 ENCOUNTER — APPOINTMENT (OUTPATIENT)
Dept: PHYSICAL THERAPY | Facility: CLINIC | Age: 45
End: 2025-01-20

## 2025-01-22 ENCOUNTER — APPOINTMENT (OUTPATIENT)
Dept: PHYSICAL THERAPY | Facility: CLINIC | Age: 45
End: 2025-01-22

## 2025-01-27 ENCOUNTER — APPOINTMENT (OUTPATIENT)
Dept: PHYSICAL THERAPY | Facility: CLINIC | Age: 45
End: 2025-01-27

## 2025-01-29 ENCOUNTER — APPOINTMENT (OUTPATIENT)
Dept: PHYSICAL THERAPY | Facility: CLINIC | Age: 45
End: 2025-01-29